# Patient Record
Sex: FEMALE | Race: WHITE | HISPANIC OR LATINO | URBAN - METROPOLITAN AREA
[De-identification: names, ages, dates, MRNs, and addresses within clinical notes are randomized per-mention and may not be internally consistent; named-entity substitution may affect disease eponyms.]

---

## 2017-10-24 ENCOUNTER — CHARTING TRANS (OUTPATIENT)
Dept: OBGYN | Age: 22
End: 2017-10-24

## 2017-10-24 ENCOUNTER — MYAURORA ACCOUNT LINK (OUTPATIENT)
Dept: OTHER | Age: 22
End: 2017-10-24

## 2017-10-26 ENCOUNTER — CHARTING TRANS (OUTPATIENT)
Dept: OTHER | Age: 22
End: 2017-10-26

## 2017-10-31 ENCOUNTER — CHARTING TRANS (OUTPATIENT)
Dept: OTHER | Age: 22
End: 2017-10-31

## 2018-01-08 ENCOUNTER — CHARTING TRANS (OUTPATIENT)
Dept: OTHER | Age: 23
End: 2018-01-08

## 2018-01-11 ENCOUNTER — IMAGING SERVICES (OUTPATIENT)
Dept: OTHER | Age: 23
End: 2018-01-11

## 2018-01-12 ENCOUNTER — CHARTING TRANS (OUTPATIENT)
Dept: OTHER | Age: 23
End: 2018-01-12

## 2018-01-16 ENCOUNTER — MYAURORA ACCOUNT LINK (OUTPATIENT)
Dept: OTHER | Age: 23
End: 2018-01-16

## 2018-01-16 ENCOUNTER — CHARTING TRANS (OUTPATIENT)
Dept: OTHER | Age: 23
End: 2018-01-16

## 2018-01-19 ENCOUNTER — CHARTING TRANS (OUTPATIENT)
Dept: OTHER | Age: 23
End: 2018-01-19

## 2018-01-23 ENCOUNTER — CHARTING TRANS (OUTPATIENT)
Dept: OTHER | Age: 23
End: 2018-01-23

## 2018-01-29 ENCOUNTER — ANCILLARY ORDERS (OUTPATIENT)
Dept: OTHER | Age: 23
End: 2018-01-29

## 2018-01-29 ENCOUNTER — CHARTING TRANS (OUTPATIENT)
Dept: OTHER | Age: 23
End: 2018-01-29

## 2018-01-29 DIAGNOSIS — Z36.3 ENCOUNTER FOR ANTENATAL SCREENING FOR MALFORMATION: ICD-10-CM

## 2018-02-22 ENCOUNTER — CHARTING TRANS (OUTPATIENT)
Dept: OTHER | Age: 23
End: 2018-02-22

## 2018-02-26 ENCOUNTER — CHARTING TRANS (OUTPATIENT)
Dept: OTHER | Age: 23
End: 2018-02-26

## 2018-03-01 ENCOUNTER — CHARTING TRANS (OUTPATIENT)
Dept: OTHER | Age: 23
End: 2018-03-01

## 2018-03-28 ENCOUNTER — CHARTING TRANS (OUTPATIENT)
Dept: OTHER | Age: 23
End: 2018-03-28

## 2018-04-02 ENCOUNTER — CHARTING TRANS (OUTPATIENT)
Dept: OTHER | Age: 23
End: 2018-04-02

## 2018-04-02 ENCOUNTER — MYAURORA ACCOUNT LINK (OUTPATIENT)
Dept: OTHER | Age: 23
End: 2018-04-02

## 2018-06-06 ENCOUNTER — CHARTING TRANS (OUTPATIENT)
Dept: OTHER | Age: 23
End: 2018-06-06

## 2018-07-30 ENCOUNTER — CHARTING TRANS (OUTPATIENT)
Dept: OTHER | Age: 23
End: 2018-07-30

## 2018-08-06 ENCOUNTER — CHARTING TRANS (OUTPATIENT)
Dept: OTHER | Age: 23
End: 2018-08-06

## 2018-10-20 ENCOUNTER — HOSPITAL ENCOUNTER (EMERGENCY)
Facility: HOSPITAL | Age: 23
Discharge: HOME | End: 2018-10-20
Attending: EMERGENCY MEDICINE

## 2018-10-20 VITALS
BODY MASS INDEX: 29.44 KG/M2 | DIASTOLIC BLOOD PRESSURE: 62 MMHG | WEIGHT: 160 LBS | RESPIRATION RATE: 17 BRPM | OXYGEN SATURATION: 100 % | HEART RATE: 98 BPM | SYSTOLIC BLOOD PRESSURE: 128 MMHG | HEIGHT: 62 IN | TEMPERATURE: 98 F

## 2018-10-20 DIAGNOSIS — S05.01XA ABRASION OF RIGHT CORNEA, INITIAL ENCOUNTER: Primary | ICD-10-CM

## 2018-10-20 PROCEDURE — 99283 EMERGENCY DEPT VISIT LOW MDM: CPT

## 2018-10-20 RX ORDER — TETRACAINE HYDROCHLORIDE 5 MG/ML
2 SOLUTION OPHTHALMIC ONCE
Status: COMPLETED | OUTPATIENT
Start: 2018-10-20 | End: 2018-10-20

## 2018-10-20 RX ORDER — CIPROFLOXACIN HYDROCHLORIDE 3.5 MG/ML
2 SOLUTION/ DROPS TOPICAL 2 TIMES DAILY
Qty: 1 BOTTLE | Refills: 0 | Status: SHIPPED | OUTPATIENT
Start: 2018-10-20 | End: 2018-10-27

## 2018-10-20 RX ORDER — TETRACAINE HYDROCHLORIDE 5 MG/ML
SOLUTION OPHTHALMIC
Status: DISCONTINUED
Start: 2018-10-20 | End: 2018-10-20

## 2018-10-20 NOTE — ED PROVIDER NOTES
Patient Seen in: [facility] Emergency Department    History   Patient presents with: Eye Visual Problem (opthalmic)    Stated Complaint: red swollen eye    HPI    14-year-old female complaining of pain in the right eye.   The patient was wearing con Labs Reviewed - No data to display       Patient did feel better with Pontocaine drops      MDM   Patient was given Cipro advised to follow-up with ophthalmologist Monday return any problems.             Disposition and Plan     Clinical Impression:  Cuco Oliver

## 2018-10-20 NOTE — ED INITIAL ASSESSMENT (HPI)
Pt notes redness to eye since Wednesday. Pt seen by provider and given abx drops for eye on Thursday. Pt notes that there is not improvement. Pt wears contacts and does not have them in at this time.  Pt unable to to visual exam.

## 2018-10-25 ENCOUNTER — CHARTING TRANS (OUTPATIENT)
Dept: OTHER | Age: 23
End: 2018-10-25

## 2018-10-29 ENCOUNTER — CHARTING TRANS (OUTPATIENT)
Dept: OTHER | Age: 23
End: 2018-10-29

## 2018-11-07 ENCOUNTER — CHARTING TRANS (OUTPATIENT)
Dept: OTHER | Age: 23
End: 2018-11-07

## 2018-11-07 ENCOUNTER — MYAURORA ACCOUNT LINK (OUTPATIENT)
Dept: OTHER | Age: 23
End: 2018-11-07

## 2018-11-28 VITALS — SYSTOLIC BLOOD PRESSURE: 118 MMHG | WEIGHT: 157 LBS | DIASTOLIC BLOOD PRESSURE: 70 MMHG

## 2019-03-06 VITALS
SYSTOLIC BLOOD PRESSURE: 100 MMHG | WEIGHT: 154 LBS | DIASTOLIC BLOOD PRESSURE: 58 MMHG | BODY MASS INDEX: 29.07 KG/M2 | HEIGHT: 61 IN

## 2019-03-06 VITALS
BODY MASS INDEX: 29.45 KG/M2 | HEIGHT: 61 IN | SYSTOLIC BLOOD PRESSURE: 112 MMHG | DIASTOLIC BLOOD PRESSURE: 70 MMHG | WEIGHT: 156 LBS

## 2021-11-08 ENCOUNTER — LAB REQUISITION (OUTPATIENT)
Dept: LAB | Age: 26
End: 2021-11-08

## 2021-11-08 ENCOUNTER — LAB SERVICES (OUTPATIENT)
Dept: LAB | Age: 26
End: 2021-11-08

## 2021-11-08 ENCOUNTER — OFFICE VISIT (OUTPATIENT)
Dept: OBGYN | Age: 26
End: 2021-11-08

## 2021-11-08 VITALS
RESPIRATION RATE: 16 BRPM | HEART RATE: 86 BPM | DIASTOLIC BLOOD PRESSURE: 70 MMHG | TEMPERATURE: 97.4 F | BODY MASS INDEX: 35.87 KG/M2 | WEIGHT: 190 LBS | SYSTOLIC BLOOD PRESSURE: 118 MMHG | HEIGHT: 61 IN

## 2021-11-08 DIAGNOSIS — O36.80X0 PREGNANCY WITH UNCERTAIN FETAL VIABILITY, SINGLE OR UNSPECIFIED FETUS: ICD-10-CM

## 2021-11-08 DIAGNOSIS — Z23 NEED FOR VACCINATION: ICD-10-CM

## 2021-11-08 DIAGNOSIS — N91.2 AMENORRHEA: Primary | ICD-10-CM

## 2021-11-08 DIAGNOSIS — Z32.00 POSSIBLE PREGNANCY, NOT CONFIRMED: ICD-10-CM

## 2021-11-08 DIAGNOSIS — N91.2 AMENORRHEA: ICD-10-CM

## 2021-11-08 DIAGNOSIS — Z11.3 SCREEN FOR STD (SEXUALLY TRANSMITTED DISEASE): ICD-10-CM

## 2021-11-08 DIAGNOSIS — N91.2 AMENORRHEA, UNSPECIFIED: ICD-10-CM

## 2021-11-08 DIAGNOSIS — O99.210 OBESITY IN PREGNANCY: ICD-10-CM

## 2021-11-08 LAB
B-HCG SERPL-ACNC: ABNORMAL M[IU]/ML (ref 0–6)
BACTERIA: ABNORMAL
BASOPHIL %: 0.3 % (ref 0–1.2)
BASOPHIL ABSOLUTE #: 0 10*3/UL (ref 0–0.1)
BILIRUBIN URINE: NEGATIVE
BLOOD URINE: NEGATIVE
CLARITY: ABNORMAL
COLOR: ABNORMAL
DIFFERENTIAL TYPE: NORMAL
EOSINOPHIL %: 0.7 % (ref 0–10)
EOSINOPHIL ABSOLUTE #: 0.1 10*3/UL (ref 0–0.5)
GLUCOSE QUALITATIVE U: NEGATIVE
HBV SURFACE AG SERPL QL IA: NEGATIVE
HEMATOCRIT: 39.4 % (ref 34–45)
HEMOGLOBIN: 13.1 G/DL (ref 11.2–15.7)
HIV1+2 AB SERPL QL IA: NEGATIVE
IMMATURE GRANULOCYTE ABSOLUTE: 0.03 10*3/UL (ref 0–0.05)
IMMATURE GRANULOCYTE PERCENT: 0.3 % (ref 0–0.5)
KETONES, URINE: ABNORMAL
LEUKOCYTE ESTERASE URINE: ABNORMAL
LYMPH PERCENT: 30.3 % (ref 20.5–51.1)
LYMPHOCYTE ABSOLUTE #: 3 10*3/UL (ref 1.2–3.4)
MEAN CORPUSCULAR HGB CONCENTRATION: 33.2 % (ref 32–36)
MEAN CORPUSCULAR HGB: 31 PG (ref 27–34)
MEAN CORPUSCULAR VOLUME: 93.4 FL (ref 79–95)
MEAN PLATELET VOLUME: 10.2 FL (ref 8.6–12.4)
MONOCYTE ABSOLUTE #: 0.6 10*3/UL (ref 0.2–0.9)
MONOCYTE PERCENT: 5.9 % (ref 4.3–12.9)
MUCOUS: ABNORMAL
NEUTROPHIL ABSOLUTE #: 6.1 10*3/UL (ref 1.4–6.5)
NEUTROPHIL PERCENT: 62.5 % (ref 34–73.5)
NITRITE URINE: POSITIVE
PH URINE: 5 (ref 5–7)
PLATELET COUNT: 325 10*3/UL (ref 150–400)
RED BLOOD CELL COUNT: 4.22 10*6/UL (ref 3.7–5.2)
RED BLOOD CELLS URINE: ABNORMAL (ref 0–3)
RED CELL DISTRIBUTION WIDTH: 12.6 % (ref 11.3–14.8)
RUBV IGG SERPL QL IA: NORMAL [IU]/ML
SPECIFIC GRAVITY URINE: 1.03 (ref 1–1.03)
SQUAMOUS EPITHELIAL CELLS: ABNORMAL
URINE PROTEIN, QUAL (DIPSTICK): 30
UROBILINOGEN URINE: <2
WHITE BLOOD CELL COUNT: 9.8 10*3/UL (ref 4–10)
WHITE BLOOD CELLS URINE: ABNORMAL (ref 0–5)

## 2021-11-08 PROCEDURE — 87086 URINE CULTURE/COLONY COUNT: CPT | Performed by: NURSE PRACTITIONER

## 2021-11-08 PROCEDURE — 81003 URINALYSIS AUTO W/O SCOPE: CPT | Performed by: NURSE PRACTITIONER

## 2021-11-08 PROCEDURE — 99204 OFFICE O/P NEW MOD 45 MIN: CPT | Performed by: NURSE PRACTITIONER

## 2021-11-08 PROCEDURE — 90471 IMMUNIZATION ADMIN: CPT

## 2021-11-08 PROCEDURE — 87801 DETECT AGNT MULT DNA AMPLI: CPT | Performed by: NURSE PRACTITIONER

## 2021-11-08 PROCEDURE — 86703 HIV-1/HIV-2 1 RESULT ANTBDY: CPT | Performed by: NURSE PRACTITIONER

## 2021-11-08 PROCEDURE — 87088 URINE BACTERIA CULTURE: CPT | Performed by: NURSE PRACTITIONER

## 2021-11-08 PROCEDURE — 84702 CHORIONIC GONADOTROPIN TEST: CPT | Performed by: NURSE PRACTITIONER

## 2021-11-08 PROCEDURE — 87186 SC STD MICRODIL/AGAR DIL: CPT | Performed by: NURSE PRACTITIONER

## 2021-11-08 PROCEDURE — 90686 IIV4 VACC NO PRSV 0.5 ML IM: CPT

## 2021-11-08 PROCEDURE — PSEU8014 ABO GROUP/RH /SCREEN OUTPATIENT: Performed by: CLINICAL MEDICAL LABORATORY

## 2021-11-08 PROCEDURE — 86850 RBC ANTIBODY SCREEN: CPT | Performed by: CLINICAL MEDICAL LABORATORY

## 2021-11-08 PROCEDURE — 86901 BLOOD TYPING SEROLOGIC RH(D): CPT | Performed by: CLINICAL MEDICAL LABORATORY

## 2021-11-08 PROCEDURE — 36415 COLL VENOUS BLD VENIPUNCTURE: CPT | Performed by: NURSE PRACTITIONER

## 2021-11-08 PROCEDURE — 87661 TRICHOMONAS VAGINALIS AMPLIF: CPT | Performed by: NURSE PRACTITIONER

## 2021-11-08 PROCEDURE — 86900 BLOOD TYPING SEROLOGIC ABO: CPT | Performed by: CLINICAL MEDICAL LABORATORY

## 2021-11-08 ASSESSMENT — PATIENT HEALTH QUESTIONNAIRE - PHQ9
CLINICAL INTERPRETATION OF PHQ2 SCORE: NO FURTHER SCREENING NEEDED
CLINICAL INTERPRETATION OF PHQ9 SCORE: NO FURTHER SCREENING NEEDED
SUM OF ALL RESPONSES TO PHQ9 QUESTIONS 1 AND 2: 0
1. LITTLE INTEREST OR PLEASURE IN DOING THINGS: NOT AT ALL
SUM OF ALL RESPONSES TO PHQ9 QUESTIONS 1 AND 2: 0
SUM OF ALL RESPONSES TO PHQ9 QUESTIONS 1 AND 2: 0
2. FEELING DOWN, DEPRESSED OR HOPELESS: NOT AT ALL

## 2021-11-09 LAB
ABO + RH BLD: NORMAL
ABO + RH BLD: NORMAL
BLD GP AB SCN SERPL QL GEL: NEGATIVE
BLD GP AB SCN SERPL QL GEL: NEGATIVE
C TRACH DNA GENITAL QL NAA+PROBE: POSITIVE
N GONORRHOEA DNA GENITAL QL NAA+PROBE: NEGATIVE
RPR SER QL: NORMAL
T VAGINALIS DNA GENITAL QL NAA+PROBE: NEGATIVE

## 2021-11-09 RX ORDER — AMOXICILLIN 500 MG/1
500 TABLET, FILM COATED ORAL 3 TIMES DAILY
Qty: 21 TABLET | Refills: 0 | Status: SHIPPED | OUTPATIENT
Start: 2021-11-09 | End: 2021-11-16

## 2021-11-10 LAB — FINAL REPORT: ABNORMAL

## 2021-11-11 DIAGNOSIS — Z87.440 HISTORY OF UTI: Primary | ICD-10-CM

## 2021-11-13 ENCOUNTER — E-ADVICE (OUTPATIENT)
Dept: OBGYN | Age: 26
End: 2021-11-13

## 2021-11-16 RX ORDER — METOCLOPRAMIDE 10 MG/1
10 TABLET ORAL 4 TIMES DAILY
Qty: 40 TABLET | Refills: 0 | Status: SHIPPED | OUTPATIENT
Start: 2021-11-16 | End: 2021-11-26

## 2021-11-17 RX ORDER — NITROFURANTOIN 25; 75 MG/1; MG/1
100 CAPSULE ORAL 2 TIMES DAILY
Qty: 10 CAPSULE | Refills: 0 | Status: SHIPPED | OUTPATIENT
Start: 2021-11-17 | End: 2022-02-11 | Stop reason: ALTCHOICE

## 2021-11-22 ENCOUNTER — E-ADVICE (OUTPATIENT)
Dept: OBGYN | Age: 26
End: 2021-11-22

## 2021-11-30 ENCOUNTER — E-ADVICE (OUTPATIENT)
Dept: OBGYN | Age: 26
End: 2021-11-30

## 2021-12-01 DIAGNOSIS — N91.2 AMENORRHEA: Primary | ICD-10-CM

## 2021-12-03 ENCOUNTER — LAB SERVICES (OUTPATIENT)
Dept: LAB | Age: 26
End: 2021-12-03

## 2021-12-03 DIAGNOSIS — Z87.440 HISTORY OF UTI: ICD-10-CM

## 2021-12-03 DIAGNOSIS — O99.210 OBESITY IN PREGNANCY: ICD-10-CM

## 2021-12-03 LAB — GLUCOSE 1H P 50 G GLC PO SERPL-MCNC: 114 MG/DL (ref 70–130)

## 2021-12-03 PROCEDURE — 82950 GLUCOSE TEST: CPT | Performed by: NURSE PRACTITIONER

## 2021-12-03 PROCEDURE — 87086 URINE CULTURE/COLONY COUNT: CPT | Performed by: NURSE PRACTITIONER

## 2021-12-03 PROCEDURE — 36415 COLL VENOUS BLD VENIPUNCTURE: CPT | Performed by: NURSE PRACTITIONER

## 2021-12-04 LAB — FINAL REPORT: NORMAL

## 2021-12-08 ENCOUNTER — IMAGING SERVICES (OUTPATIENT)
Dept: ULTRASOUND IMAGING | Age: 26
End: 2021-12-08
Attending: NURSE PRACTITIONER

## 2021-12-08 ENCOUNTER — FIRST OB VISIT (OUTPATIENT)
Dept: OBGYN | Age: 26
End: 2021-12-08

## 2021-12-08 ENCOUNTER — TELEPHONE (OUTPATIENT)
Dept: OBGYN | Age: 26
End: 2021-12-08

## 2021-12-08 VITALS
SYSTOLIC BLOOD PRESSURE: 118 MMHG | HEIGHT: 61 IN | TEMPERATURE: 97 F | WEIGHT: 188.38 LBS | BODY MASS INDEX: 35.57 KG/M2 | RESPIRATION RATE: 16 BRPM | DIASTOLIC BLOOD PRESSURE: 70 MMHG

## 2021-12-08 DIAGNOSIS — N91.2 AMENORRHEA: ICD-10-CM

## 2021-12-08 DIAGNOSIS — Z11.8 SCREENING FOR CHLAMYDIAL DISEASE: ICD-10-CM

## 2021-12-08 DIAGNOSIS — A74.9 CHLAMYDIA INFECTION AFFECTING PREGNANCY IN FIRST TRIMESTER: ICD-10-CM

## 2021-12-08 DIAGNOSIS — O98.811 CHLAMYDIA INFECTION AFFECTING PREGNANCY IN FIRST TRIMESTER: ICD-10-CM

## 2021-12-08 DIAGNOSIS — O99.211 OBESITY AFFECTING PREGNANCY IN FIRST TRIMESTER: Primary | ICD-10-CM

## 2021-12-08 PROBLEM — O99.210 OBESITY COMPLICATING PREGNANCY: Status: ACTIVE | Noted: 2021-12-08

## 2021-12-08 PROCEDURE — 0500F INITIAL PRENATAL CARE VISIT: CPT | Performed by: OBSTETRICS & GYNECOLOGY

## 2021-12-08 PROCEDURE — 87491 CHLMYD TRACH DNA AMP PROBE: CPT | Performed by: OBSTETRICS & GYNECOLOGY

## 2021-12-08 PROCEDURE — 87661 TRICHOMONAS VAGINALIS AMPLIF: CPT | Performed by: OBSTETRICS & GYNECOLOGY

## 2021-12-08 PROCEDURE — 76801 OB US < 14 WKS SINGLE FETUS: CPT | Performed by: RADIOLOGY

## 2021-12-08 PROCEDURE — 87591 N.GONORRHOEAE DNA AMP PROB: CPT | Performed by: OBSTETRICS & GYNECOLOGY

## 2021-12-08 RX ORDER — ONDANSETRON HYDROCHLORIDE 8 MG/1
8 TABLET, FILM COATED ORAL EVERY 8 HOURS PRN
Qty: 20 TABLET | Refills: 3 | Status: SHIPPED | OUTPATIENT
Start: 2021-12-08 | End: 2022-06-22 | Stop reason: ALTCHOICE

## 2021-12-08 ASSESSMENT — LIFESTYLE VARIABLES
DOES YOUR PARTNER HAVE A PROBLEM WITH ALCOHOL OR DRUG USE?: NO
IN THE PAST MONTH, DID YOU DRINK BEER, WINE OR LIQUOR, OR USE OTHER DRUGS? (PREGNANCY): NO
BEFORE YOU WERE PREGNANT DID YOU HAVE A PROBLEM WITH ALCOHOL OR DRUG USE? (PAST): NO
DID ANY OF YOUR PARENTS HAVE PROBLEMS WITH ALCOHOL OR DRUG USE?: NO
DO ANY OF YOUR FRIENDS (PEERS) HAVE PROBLEMS WITH ALCOHOL OR DRUG USE?: NO

## 2021-12-09 LAB
C TRACH DNA UR QL NAA+PROBE: NEGATIVE
N GONORRHOEA DNA UR QL NAA+PROBE: NEGATIVE
T VAGINALIS DNA UR QL NAA+PROBE: NEGATIVE

## 2021-12-22 ENCOUNTER — LAB SERVICES (OUTPATIENT)
Dept: LAB | Age: 26
End: 2021-12-22

## 2021-12-22 DIAGNOSIS — Z82.79 FAMILY HISTORY OF DOWNS SYNDROME: ICD-10-CM

## 2021-12-22 PROCEDURE — 36415 COLL VENOUS BLD VENIPUNCTURE: CPT | Performed by: OBSTETRICS & GYNECOLOGY

## 2021-12-28 LAB — SEND OUT RESULTS: NORMAL

## 2022-01-05 ENCOUNTER — APPOINTMENT (OUTPATIENT)
Dept: OBGYN | Age: 27
End: 2022-01-05

## 2022-01-10 ENCOUNTER — TELEPHONE (OUTPATIENT)
Dept: OBGYN | Age: 27
End: 2022-01-10

## 2022-01-10 ENCOUNTER — OB CHECK (OUTPATIENT)
Dept: OBGYN | Age: 27
End: 2022-01-10

## 2022-01-10 VITALS
BODY MASS INDEX: 34.67 KG/M2 | RESPIRATION RATE: 18 BRPM | TEMPERATURE: 97 F | WEIGHT: 183.5 LBS | DIASTOLIC BLOOD PRESSURE: 68 MMHG | SYSTOLIC BLOOD PRESSURE: 118 MMHG | HEART RATE: 70 BPM

## 2022-01-10 DIAGNOSIS — O09.90 SUPERVISION OF HIGH RISK PREGNANCY, ANTEPARTUM: Primary | ICD-10-CM

## 2022-01-10 PROCEDURE — 0502F SUBSEQUENT PRENATAL CARE: CPT | Performed by: OBSTETRICS & GYNECOLOGY

## 2022-02-07 ENCOUNTER — APPOINTMENT (OUTPATIENT)
Dept: OBGYN | Age: 27
End: 2022-02-07

## 2022-02-10 ENCOUNTER — APPOINTMENT (OUTPATIENT)
Dept: OBGYN | Age: 27
End: 2022-02-10

## 2022-02-11 ENCOUNTER — OB CHECK (OUTPATIENT)
Dept: OBGYN | Age: 27
End: 2022-02-11

## 2022-02-11 VITALS
HEART RATE: 80 BPM | HEIGHT: 61 IN | WEIGHT: 202.2 LBS | TEMPERATURE: 97.2 F | SYSTOLIC BLOOD PRESSURE: 130 MMHG | BODY MASS INDEX: 38.18 KG/M2 | RESPIRATION RATE: 20 BRPM | DIASTOLIC BLOOD PRESSURE: 68 MMHG

## 2022-02-11 DIAGNOSIS — O99.210 OBESITY AFFECTING PREGNANCY, ANTEPARTUM: Primary | ICD-10-CM

## 2022-02-11 PROBLEM — Z23 COVID-19 VACCINE ADMINISTERED: Status: ACTIVE | Noted: 2022-02-11

## 2022-02-11 PROBLEM — Z78.9 NOT IMMUNE TO RUBELLA: Status: ACTIVE | Noted: 2022-02-11

## 2022-02-11 PROCEDURE — 0502F SUBSEQUENT PRENATAL CARE: CPT | Performed by: NURSE PRACTITIONER

## 2022-02-17 ENCOUNTER — IMAGING SERVICES (OUTPATIENT)
Dept: ULTRASOUND IMAGING | Age: 27
End: 2022-02-17
Attending: OBSTETRICS & GYNECOLOGY

## 2022-02-17 ENCOUNTER — OB CHECK (OUTPATIENT)
Dept: MATERNAL FETAL MEDICINE | Age: 27
End: 2022-02-17

## 2022-02-17 VITALS — WEIGHT: 201.4 LBS | BODY MASS INDEX: 38.05 KG/M2 | SYSTOLIC BLOOD PRESSURE: 118 MMHG | DIASTOLIC BLOOD PRESSURE: 80 MMHG

## 2022-02-17 DIAGNOSIS — O99.212 OBESITY AFFECTING PREGNANCY IN SECOND TRIMESTER: Primary | ICD-10-CM

## 2022-02-17 DIAGNOSIS — O09.90 SUPERVISION OF HIGH RISK PREGNANCY, ANTEPARTUM: ICD-10-CM

## 2022-02-17 PROCEDURE — X1094 US OB W DETAILED FETAL ANATOMIC EVAL 1 FETUS: HCPCS | Performed by: OBSTETRICS & GYNECOLOGY

## 2022-02-17 PROCEDURE — 99202 OFFICE O/P NEW SF 15 MIN: CPT | Performed by: OBSTETRICS & GYNECOLOGY

## 2022-02-17 PROCEDURE — 76811 OB US DETAILED SNGL FETUS: CPT | Performed by: OBSTETRICS & GYNECOLOGY

## 2022-02-25 ENCOUNTER — WALK IN (OUTPATIENT)
Dept: URGENT CARE | Age: 27
End: 2022-02-25

## 2022-02-25 VITALS
TEMPERATURE: 97.8 F | DIASTOLIC BLOOD PRESSURE: 70 MMHG | HEART RATE: 102 BPM | OXYGEN SATURATION: 98 % | SYSTOLIC BLOOD PRESSURE: 110 MMHG | RESPIRATION RATE: 16 BRPM

## 2022-02-25 DIAGNOSIS — J32.9 SINUSITIS, UNSPECIFIED CHRONICITY, UNSPECIFIED LOCATION: Primary | ICD-10-CM

## 2022-02-25 PROCEDURE — 99214 OFFICE O/P EST MOD 30 MIN: CPT | Performed by: FAMILY MEDICINE

## 2022-02-25 RX ORDER — AMOXICILLIN 875 MG/1
875 TABLET, COATED ORAL 2 TIMES DAILY
Qty: 20 TABLET | Refills: 0 | Status: SHIPPED | OUTPATIENT
Start: 2022-02-25 | End: 2022-03-07

## 2022-03-15 ENCOUNTER — OB CHECK (OUTPATIENT)
Dept: OBGYN | Age: 27
End: 2022-03-15

## 2022-03-15 VITALS
BODY MASS INDEX: 37.98 KG/M2 | RESPIRATION RATE: 16 BRPM | SYSTOLIC BLOOD PRESSURE: 124 MMHG | DIASTOLIC BLOOD PRESSURE: 70 MMHG | WEIGHT: 201 LBS | TEMPERATURE: 97.5 F

## 2022-03-15 DIAGNOSIS — O99.212 OBESITY AFFECTING PREGNANCY IN SECOND TRIMESTER: Primary | ICD-10-CM

## 2022-03-15 PROCEDURE — 0502F SUBSEQUENT PRENATAL CARE: CPT | Performed by: OBSTETRICS & GYNECOLOGY

## 2022-03-31 PROCEDURE — 99219 INITIAL OBSERVATION CARE,LEVL II: CPT | Performed by: OBSTETRICS & GYNECOLOGY

## 2022-03-31 PROCEDURE — 59025 FETAL NON-STRESS TEST: CPT | Performed by: OBSTETRICS & GYNECOLOGY

## 2022-04-04 RX ORDER — NITROFURANTOIN 25; 75 MG/1; MG/1
100 CAPSULE ORAL 2 TIMES DAILY
Qty: 6 CAPSULE | Refills: 0 | Status: SHIPPED | OUTPATIENT
Start: 2022-04-04 | End: 2022-04-07

## 2022-04-05 ENCOUNTER — TELEPHONE (OUTPATIENT)
Dept: OBGYN | Age: 27
End: 2022-04-05

## 2022-04-12 ENCOUNTER — LAB SERVICES (OUTPATIENT)
Dept: LAB | Age: 27
End: 2022-04-12

## 2022-04-12 ENCOUNTER — OB CHECK (OUTPATIENT)
Dept: OBGYN | Age: 27
End: 2022-04-12

## 2022-04-12 ENCOUNTER — TELEPHONE (OUTPATIENT)
Dept: OBGYN | Age: 27
End: 2022-04-12

## 2022-04-12 VITALS
BODY MASS INDEX: 38.78 KG/M2 | SYSTOLIC BLOOD PRESSURE: 120 MMHG | DIASTOLIC BLOOD PRESSURE: 70 MMHG | TEMPERATURE: 96.6 F | WEIGHT: 205.25 LBS | RESPIRATION RATE: 16 BRPM

## 2022-04-12 DIAGNOSIS — O99.213 OBESITY AFFECTING PREGNANCY IN THIRD TRIMESTER: Primary | ICD-10-CM

## 2022-04-12 DIAGNOSIS — O99.212 OBESITY AFFECTING PREGNANCY IN SECOND TRIMESTER: ICD-10-CM

## 2022-04-12 DIAGNOSIS — Z23 NEED FOR TDAP VACCINATION: ICD-10-CM

## 2022-04-12 LAB
BASOPHIL %: 0.2 % (ref 0–1.2)
BASOPHIL ABSOLUTE #: 0 10*3/UL (ref 0–0.1)
DIFFERENTIAL TYPE: ABNORMAL
EOSINOPHIL %: 0.7 % (ref 0–10)
EOSINOPHIL ABSOLUTE #: 0.1 10*3/UL (ref 0–0.5)
GLUCOSE 1H P 50 G GLC PO SERPL-MCNC: 82 MG/DL (ref 70–130)
HEMATOCRIT: 36.9 % (ref 34–45)
HEMOGLOBIN: 12.4 G/DL (ref 11.2–15.7)
HIV1+2 AB SERPL QL IA: NEGATIVE
IMMATURE GRANULOCYTE ABSOLUTE: 0.19 10*3/UL (ref 0–0.05)
IMMATURE GRANULOCYTE PERCENT: 1.6 % (ref 0–0.5)
LYMPH PERCENT: 27.6 % (ref 20.5–51.1)
LYMPHOCYTE ABSOLUTE #: 3.3 10*3/UL (ref 1.2–3.4)
MEAN CORPUSCULAR HGB CONCENTRATION: 33.6 % (ref 32–36)
MEAN CORPUSCULAR HGB: 31.9 PG (ref 27–34)
MEAN CORPUSCULAR VOLUME: 94.9 FL (ref 79–95)
MEAN PLATELET VOLUME: 10.1 FL (ref 8.6–12.4)
MONOCYTE ABSOLUTE #: 0.7 10*3/UL (ref 0.2–0.9)
MONOCYTE PERCENT: 6.1 % (ref 4.3–12.9)
NEUTROPHIL ABSOLUTE #: 7.7 10*3/UL (ref 1.4–6.5)
NEUTROPHIL PERCENT: 63.8 % (ref 34–73.5)
PLATELET COUNT: 286 10*3/UL (ref 150–400)
RED BLOOD CELL COUNT: 3.89 10*6/UL (ref 3.7–5.2)
RED CELL DISTRIBUTION WIDTH: 12.8 % (ref 11.3–14.8)
WHITE BLOOD CELL COUNT: 12.1 10*3/UL (ref 4–10)

## 2022-04-12 PROCEDURE — 82950 GLUCOSE TEST: CPT | Performed by: OBSTETRICS & GYNECOLOGY

## 2022-04-12 PROCEDURE — 0502F SUBSEQUENT PRENATAL CARE: CPT | Performed by: OBSTETRICS & GYNECOLOGY

## 2022-04-12 PROCEDURE — 90715 TDAP VACCINE 7 YRS/> IM: CPT | Performed by: OBSTETRICS & GYNECOLOGY

## 2022-04-12 PROCEDURE — 90471 IMMUNIZATION ADMIN: CPT | Performed by: OBSTETRICS & GYNECOLOGY

## 2022-04-12 PROCEDURE — 86592 SYPHILIS TEST NON-TREP QUAL: CPT | Performed by: OBSTETRICS & GYNECOLOGY

## 2022-04-12 PROCEDURE — 85025 COMPLETE CBC W/AUTO DIFF WBC: CPT | Performed by: OBSTETRICS & GYNECOLOGY

## 2022-04-12 PROCEDURE — 36415 COLL VENOUS BLD VENIPUNCTURE: CPT | Performed by: OBSTETRICS & GYNECOLOGY

## 2022-04-12 PROCEDURE — 86703 HIV-1/HIV-2 1 RESULT ANTBDY: CPT | Performed by: OBSTETRICS & GYNECOLOGY

## 2022-04-13 LAB — RPR SER QL: NORMAL

## 2022-04-21 ENCOUNTER — TELEPHONE (OUTPATIENT)
Dept: OBGYN | Age: 27
End: 2022-04-21

## 2022-04-25 RX ORDER — AMOXICILLIN 875 MG/1
TABLET, COATED ORAL
Qty: 20 TABLET | Refills: 0 | OUTPATIENT
Start: 2022-04-25

## 2022-04-28 ENCOUNTER — IMAGING SERVICES (OUTPATIENT)
Dept: ULTRASOUND IMAGING | Age: 27
End: 2022-04-28
Attending: OBSTETRICS & GYNECOLOGY

## 2022-04-28 ENCOUNTER — TELEPHONE (OUTPATIENT)
Dept: OBGYN | Age: 27
End: 2022-04-28

## 2022-04-28 ENCOUNTER — LAB REQUISITION (OUTPATIENT)
Dept: LAB | Age: 27
End: 2022-04-28

## 2022-04-28 ENCOUNTER — OB CHECK (OUTPATIENT)
Dept: MATERNAL FETAL MEDICINE | Age: 27
End: 2022-04-28

## 2022-04-28 ENCOUNTER — LAB SERVICES (OUTPATIENT)
Dept: LAB | Age: 27
End: 2022-04-28

## 2022-04-28 VITALS
HEART RATE: 104 BPM | WEIGHT: 207.6 LBS | RESPIRATION RATE: 20 BRPM | BODY MASS INDEX: 39.23 KG/M2 | DIASTOLIC BLOOD PRESSURE: 76 MMHG | SYSTOLIC BLOOD PRESSURE: 120 MMHG

## 2022-04-28 DIAGNOSIS — O99.213 OBESITY AFFECTING PREGNANCY IN THIRD TRIMESTER: Primary | ICD-10-CM

## 2022-04-28 DIAGNOSIS — O99.212 OBESITY AFFECTING PREGNANCY IN SECOND TRIMESTER: ICD-10-CM

## 2022-04-28 DIAGNOSIS — O40.3XX0 POLYHYDRAMNIOS, THIRD TRIMESTER, NOT APPLICABLE OR UNSPECIFIED: ICD-10-CM

## 2022-04-28 DIAGNOSIS — O40.3XX0 POLYHYDRAMNIOS IN THIRD TRIMESTER COMPLICATION, SINGLE OR UNSPECIFIED FETUS: ICD-10-CM

## 2022-04-28 PROCEDURE — PSEU8027 ANTIBODY SCREEN: Performed by: CLINICAL MEDICAL LABORATORY

## 2022-04-28 PROCEDURE — 36415 COLL VENOUS BLD VENIPUNCTURE: CPT | Performed by: OBSTETRICS & GYNECOLOGY

## 2022-04-28 PROCEDURE — 86644 CMV ANTIBODY: CPT | Performed by: OBSTETRICS & GYNECOLOGY

## 2022-04-28 PROCEDURE — PSEU8547 PARVOVIRUS B19 ANTIBODY IGG & IGM: Performed by: CLINICAL MEDICAL LABORATORY

## 2022-04-28 PROCEDURE — 86850 RBC ANTIBODY SCREEN: CPT | Performed by: OBSTETRICS & GYNECOLOGY

## 2022-04-28 PROCEDURE — 99214 OFFICE O/P EST MOD 30 MIN: CPT | Performed by: OBSTETRICS & GYNECOLOGY

## 2022-04-28 PROCEDURE — 76816 OB US FOLLOW-UP PER FETUS: CPT | Performed by: OBSTETRICS & GYNECOLOGY

## 2022-04-28 PROCEDURE — 86644 CMV ANTIBODY: CPT | Performed by: CLINICAL MEDICAL LABORATORY

## 2022-04-28 PROCEDURE — PSEU8473 CMV ANTIBODY IGG & IGM W REFLEX: Performed by: CLINICAL MEDICAL LABORATORY

## 2022-04-28 PROCEDURE — 86747 PARVOVIRUS ANTIBODY: CPT | Performed by: CLINICAL MEDICAL LABORATORY

## 2022-04-28 PROCEDURE — PSEU9493 CYTOMEGALOVIRUS AVIDITY, IGG: Performed by: CLINICAL MEDICAL LABORATORY

## 2022-04-28 PROCEDURE — 86747 PARVOVIRUS ANTIBODY: CPT | Performed by: OBSTETRICS & GYNECOLOGY

## 2022-04-28 PROCEDURE — 86645 CMV ANTIBODY IGM: CPT | Performed by: OBSTETRICS & GYNECOLOGY

## 2022-04-28 PROCEDURE — 86592 SYPHILIS TEST NON-TREP QUAL: CPT | Performed by: OBSTETRICS & GYNECOLOGY

## 2022-04-28 PROCEDURE — 86645 CMV ANTIBODY IGM: CPT | Performed by: CLINICAL MEDICAL LABORATORY

## 2022-04-28 PROCEDURE — 86850 RBC ANTIBODY SCREEN: CPT | Performed by: CLINICAL MEDICAL LABORATORY

## 2022-04-29 LAB
BLD GP AB SCN SERPL QL GEL: NEGATIVE
BLD GP AB SCN SERPL QL GEL: NEGATIVE
RPR SER QL: NORMAL

## 2022-05-02 LAB
B19V IGG SER IA-ACNC: 0.2 IV
B19V IGG SER IA-ACNC: 0.2 IV
B19V IGM SER IA-ACNC: 0.1 IV
B19V IGM SER IA-ACNC: 0.1 IV
CMV IGG SERPL IA-ACNC: 1.11 ISR
CMV IGG SERPL IA-ACNC: 1.11 ISR
CMV IGM SERPL IA-ACNC: 0.83 OD RATIO
CMV IGM SERPL QL IA: 0.83 OD RATIO

## 2022-05-03 LAB
CMV IGG AVIDITY SERPL IA-RTO: 0.71 %
CMV IGG AVIDITY SERPL-RTO: 0.71

## 2022-05-06 ENCOUNTER — TELEPHONE (OUTPATIENT)
Dept: OBGYN | Age: 27
End: 2022-05-06

## 2022-05-11 ENCOUNTER — IMAGING SERVICES (OUTPATIENT)
Dept: ULTRASOUND IMAGING | Age: 27
End: 2022-05-11
Attending: OBSTETRICS & GYNECOLOGY

## 2022-05-11 ENCOUNTER — OB CHECK (OUTPATIENT)
Dept: OBGYN | Age: 27
End: 2022-05-11

## 2022-05-11 VITALS
SYSTOLIC BLOOD PRESSURE: 122 MMHG | WEIGHT: 207.12 LBS | BODY MASS INDEX: 39.14 KG/M2 | HEART RATE: 88 BPM | RESPIRATION RATE: 16 BRPM | DIASTOLIC BLOOD PRESSURE: 72 MMHG

## 2022-05-11 DIAGNOSIS — O99.213 OBESITY AFFECTING PREGNANCY IN THIRD TRIMESTER: ICD-10-CM

## 2022-05-11 DIAGNOSIS — O99.213 OBESITY AFFECTING PREGNANCY IN THIRD TRIMESTER: Primary | ICD-10-CM

## 2022-05-11 DIAGNOSIS — O40.3XX0 POLYHYDRAMNIOS IN THIRD TRIMESTER COMPLICATION, SINGLE OR UNSPECIFIED FETUS: ICD-10-CM

## 2022-05-11 PROCEDURE — 0502F SUBSEQUENT PRENATAL CARE: CPT | Performed by: OBSTETRICS & GYNECOLOGY

## 2022-05-11 PROCEDURE — 76815 OB US LIMITED FETUS(S): CPT | Performed by: RADIOLOGY

## 2022-05-11 PROCEDURE — 59025 FETAL NON-STRESS TEST: CPT

## 2022-05-13 ENCOUNTER — APPOINTMENT (OUTPATIENT)
Dept: OBGYN | Age: 27
End: 2022-05-13

## 2022-05-18 ENCOUNTER — APPOINTMENT (OUTPATIENT)
Dept: ULTRASOUND IMAGING | Age: 27
End: 2022-05-18
Attending: OBSTETRICS & GYNECOLOGY

## 2022-05-18 ENCOUNTER — TELEPHONE (OUTPATIENT)
Dept: OBGYN | Age: 27
End: 2022-05-18

## 2022-05-18 ENCOUNTER — APPOINTMENT (OUTPATIENT)
Dept: OBGYN | Age: 27
End: 2022-05-18

## 2022-05-19 ENCOUNTER — IMAGING SERVICES (OUTPATIENT)
Dept: ULTRASOUND IMAGING | Age: 27
End: 2022-05-19
Attending: OBSTETRICS & GYNECOLOGY

## 2022-05-19 ENCOUNTER — OB CHECK (OUTPATIENT)
Dept: OBGYN | Age: 27
End: 2022-05-19

## 2022-05-19 VITALS
RESPIRATION RATE: 14 BRPM | SYSTOLIC BLOOD PRESSURE: 106 MMHG | DIASTOLIC BLOOD PRESSURE: 56 MMHG | WEIGHT: 211.2 LBS | TEMPERATURE: 98.2 F | BODY MASS INDEX: 39.91 KG/M2 | HEART RATE: 84 BPM

## 2022-05-19 DIAGNOSIS — O40.3XX0 POLYHYDRAMNIOS IN THIRD TRIMESTER COMPLICATION, SINGLE OR UNSPECIFIED FETUS: ICD-10-CM

## 2022-05-19 DIAGNOSIS — O99.213 OBESITY AFFECTING PREGNANCY IN THIRD TRIMESTER: ICD-10-CM

## 2022-05-19 DIAGNOSIS — O99.213 OBESITY AFFECTING PREGNANCY IN THIRD TRIMESTER: Primary | ICD-10-CM

## 2022-05-19 DIAGNOSIS — O09.90 SUPERVISION OF HIGH RISK PREGNANCY, ANTEPARTUM: Primary | ICD-10-CM

## 2022-05-19 PROCEDURE — 59025 FETAL NON-STRESS TEST: CPT

## 2022-05-19 PROCEDURE — 76815 OB US LIMITED FETUS(S): CPT | Performed by: RADIOLOGY

## 2022-05-19 PROCEDURE — 0502F SUBSEQUENT PRENATAL CARE: CPT | Performed by: OBSTETRICS & GYNECOLOGY

## 2022-05-20 ENCOUNTER — APPOINTMENT (OUTPATIENT)
Dept: ULTRASOUND IMAGING | Age: 27
End: 2022-05-20
Attending: OBSTETRICS & GYNECOLOGY

## 2022-05-25 ENCOUNTER — OB CHECK (OUTPATIENT)
Dept: OBGYN | Age: 27
End: 2022-05-25

## 2022-05-25 ENCOUNTER — IMAGING SERVICES (OUTPATIENT)
Dept: ULTRASOUND IMAGING | Age: 27
End: 2022-05-25
Attending: OBSTETRICS & GYNECOLOGY

## 2022-05-25 VITALS
BODY MASS INDEX: 39.93 KG/M2 | DIASTOLIC BLOOD PRESSURE: 68 MMHG | HEART RATE: 70 BPM | RESPIRATION RATE: 16 BRPM | SYSTOLIC BLOOD PRESSURE: 110 MMHG | WEIGHT: 211.31 LBS

## 2022-05-25 DIAGNOSIS — O99.213 OBESITY AFFECTING PREGNANCY IN THIRD TRIMESTER: Primary | ICD-10-CM

## 2022-05-25 DIAGNOSIS — O99.213 OBESITY AFFECTING PREGNANCY IN THIRD TRIMESTER: ICD-10-CM

## 2022-05-25 DIAGNOSIS — O40.3XX0 POLYHYDRAMNIOS IN THIRD TRIMESTER COMPLICATION, SINGLE OR UNSPECIFIED FETUS: ICD-10-CM

## 2022-05-25 DIAGNOSIS — O40.3XX0 POLYHYDRAMNIOS IN THIRD TRIMESTER COMPLICATION, SINGLE OR UNSPECIFIED FETUS: Primary | ICD-10-CM

## 2022-05-25 PROCEDURE — 76815 OB US LIMITED FETUS(S): CPT | Performed by: RADIOLOGY

## 2022-05-25 PROCEDURE — 0502F SUBSEQUENT PRENATAL CARE: CPT | Performed by: OBSTETRICS & GYNECOLOGY

## 2022-05-25 PROCEDURE — 59025 FETAL NON-STRESS TEST: CPT | Performed by: OBSTETRICS & GYNECOLOGY

## 2022-05-26 ENCOUNTER — APPOINTMENT (OUTPATIENT)
Dept: ULTRASOUND IMAGING | Age: 27
End: 2022-05-26
Attending: OBSTETRICS & GYNECOLOGY

## 2022-05-26 ENCOUNTER — OB CHECK (OUTPATIENT)
Dept: MATERNAL FETAL MEDICINE | Age: 27
End: 2022-05-26

## 2022-05-26 ENCOUNTER — IMAGING SERVICES (OUTPATIENT)
Dept: ULTRASOUND IMAGING | Age: 27
End: 2022-05-26
Attending: OBSTETRICS & GYNECOLOGY

## 2022-05-26 VITALS
WEIGHT: 212.2 LBS | BODY MASS INDEX: 40.09 KG/M2 | DIASTOLIC BLOOD PRESSURE: 84 MMHG | HEART RATE: 96 BPM | RESPIRATION RATE: 20 BRPM | SYSTOLIC BLOOD PRESSURE: 120 MMHG

## 2022-05-26 DIAGNOSIS — O40.3XX0 POLYHYDRAMNIOS IN THIRD TRIMESTER COMPLICATION, SINGLE OR UNSPECIFIED FETUS: ICD-10-CM

## 2022-05-26 DIAGNOSIS — O40.3XX0 POLYHYDRAMNIOS IN THIRD TRIMESTER COMPLICATION, SINGLE OR UNSPECIFIED FETUS: Primary | ICD-10-CM

## 2022-05-26 DIAGNOSIS — O99.213 OBESITY AFFECTING PREGNANCY IN THIRD TRIMESTER: ICD-10-CM

## 2022-05-26 PROCEDURE — 76816 OB US FOLLOW-UP PER FETUS: CPT | Performed by: OBSTETRICS & GYNECOLOGY

## 2022-05-26 PROCEDURE — 99214 OFFICE O/P EST MOD 30 MIN: CPT | Performed by: OBSTETRICS & GYNECOLOGY

## 2022-05-27 ENCOUNTER — DOCUMENTATION (OUTPATIENT)
Dept: OBGYN | Age: 27
End: 2022-05-27

## 2022-05-27 ENCOUNTER — APPOINTMENT (OUTPATIENT)
Dept: OBGYN | Age: 27
End: 2022-05-27

## 2022-06-01 ENCOUNTER — LAB SERVICES (OUTPATIENT)
Dept: LAB | Age: 27
End: 2022-06-01

## 2022-06-01 ENCOUNTER — LAB REQUISITION (OUTPATIENT)
Dept: LAB | Age: 27
End: 2022-06-01

## 2022-06-01 ENCOUNTER — IMAGING SERVICES (OUTPATIENT)
Dept: ULTRASOUND IMAGING | Age: 27
End: 2022-06-01
Attending: OBSTETRICS & GYNECOLOGY

## 2022-06-01 ENCOUNTER — OB CHECK (OUTPATIENT)
Dept: OBGYN | Age: 27
End: 2022-06-01

## 2022-06-01 VITALS
BODY MASS INDEX: 40.32 KG/M2 | HEART RATE: 104 BPM | TEMPERATURE: 96.8 F | WEIGHT: 213.41 LBS | SYSTOLIC BLOOD PRESSURE: 110 MMHG | RESPIRATION RATE: 20 BRPM | DIASTOLIC BLOOD PRESSURE: 80 MMHG

## 2022-06-01 DIAGNOSIS — O40.3XX0 POLYHYDRAMNIOS IN THIRD TRIMESTER COMPLICATION, SINGLE OR UNSPECIFIED FETUS: ICD-10-CM

## 2022-06-01 DIAGNOSIS — O99.213 OBESITY AFFECTING PREGNANCY IN THIRD TRIMESTER: ICD-10-CM

## 2022-06-01 DIAGNOSIS — O99.213 OBESITY AFFECTING PREGNANCY IN THIRD TRIMESTER: Primary | ICD-10-CM

## 2022-06-01 DIAGNOSIS — O40.3XX0 POLYHYDRAMNIOS, THIRD TRIMESTER, NOT APPLICABLE OR UNSPECIFIED: ICD-10-CM

## 2022-06-01 DIAGNOSIS — O40.3XX0 POLYHYDRAMNIOS IN THIRD TRIMESTER COMPLICATION, SINGLE OR UNSPECIFIED FETUS: Primary | ICD-10-CM

## 2022-06-01 PROCEDURE — 76815 OB US LIMITED FETUS(S): CPT | Performed by: RADIOLOGY

## 2022-06-01 PROCEDURE — 86644 CMV ANTIBODY: CPT | Performed by: OBSTETRICS & GYNECOLOGY

## 2022-06-01 PROCEDURE — 86644 CMV ANTIBODY: CPT | Performed by: CLINICAL MEDICAL LABORATORY

## 2022-06-01 PROCEDURE — 59025 FETAL NON-STRESS TEST: CPT | Performed by: OBSTETRICS & GYNECOLOGY

## 2022-06-01 PROCEDURE — 36415 COLL VENOUS BLD VENIPUNCTURE: CPT | Performed by: OBSTETRICS & GYNECOLOGY

## 2022-06-01 PROCEDURE — 0502F SUBSEQUENT PRENATAL CARE: CPT | Performed by: OBSTETRICS & GYNECOLOGY

## 2022-06-01 PROCEDURE — PSEU8513 CMV ANTIBODY IGG: Performed by: CLINICAL MEDICAL LABORATORY

## 2022-06-03 ENCOUNTER — MED INFO FORMS (OUTPATIENT)
Dept: HEALTH INFORMATION MANAGEMENT | Facility: OTHER | Age: 27
End: 2022-06-03

## 2022-06-03 LAB
CMV IGG SERPL IA-ACNC: 1.16 ISR
CMV IGG SERPL IA-ACNC: 1.16 ISR

## 2022-06-07 ENCOUNTER — APPOINTMENT (OUTPATIENT)
Dept: OBGYN | Age: 27
End: 2022-06-07

## 2022-06-08 ENCOUNTER — OB CHECK (OUTPATIENT)
Dept: OBGYN | Age: 27
End: 2022-06-08

## 2022-06-08 ENCOUNTER — IMAGING SERVICES (OUTPATIENT)
Dept: ULTRASOUND IMAGING | Age: 27
End: 2022-06-08
Attending: OBSTETRICS & GYNECOLOGY

## 2022-06-08 VITALS
RESPIRATION RATE: 16 BRPM | SYSTOLIC BLOOD PRESSURE: 108 MMHG | DIASTOLIC BLOOD PRESSURE: 60 MMHG | HEART RATE: 80 BPM | WEIGHT: 215.72 LBS | BODY MASS INDEX: 40.76 KG/M2

## 2022-06-08 DIAGNOSIS — O40.3XX0 POLYHYDRAMNIOS IN THIRD TRIMESTER COMPLICATION, SINGLE OR UNSPECIFIED FETUS: ICD-10-CM

## 2022-06-08 DIAGNOSIS — O99.213 OBESITY AFFECTING PREGNANCY IN THIRD TRIMESTER: Primary | ICD-10-CM

## 2022-06-08 DIAGNOSIS — Z11.8 SCREENING FOR CHLAMYDIAL DISEASE: ICD-10-CM

## 2022-06-08 DIAGNOSIS — Z36.85 SCREENING, ANTENATAL, FOR STREPTOCOCCUS B: ICD-10-CM

## 2022-06-08 DIAGNOSIS — O99.213 OBESITY AFFECTING PREGNANCY IN THIRD TRIMESTER: ICD-10-CM

## 2022-06-08 PROCEDURE — 87591 N.GONORRHOEAE DNA AMP PROB: CPT | Performed by: OBSTETRICS & GYNECOLOGY

## 2022-06-08 PROCEDURE — 87081 CULTURE SCREEN ONLY: CPT | Performed by: OBSTETRICS & GYNECOLOGY

## 2022-06-08 PROCEDURE — 87491 CHLMYD TRACH DNA AMP PROBE: CPT | Performed by: OBSTETRICS & GYNECOLOGY

## 2022-06-08 PROCEDURE — 59025 FETAL NON-STRESS TEST: CPT | Performed by: OBSTETRICS & GYNECOLOGY

## 2022-06-08 PROCEDURE — 76815 OB US LIMITED FETUS(S): CPT | Performed by: RADIOLOGY

## 2022-06-08 PROCEDURE — 87653 STREP B DNA AMP PROBE: CPT | Performed by: OBSTETRICS & GYNECOLOGY

## 2022-06-08 PROCEDURE — 87661 TRICHOMONAS VAGINALIS AMPLIF: CPT | Performed by: OBSTETRICS & GYNECOLOGY

## 2022-06-08 PROCEDURE — 0502F SUBSEQUENT PRENATAL CARE: CPT | Performed by: OBSTETRICS & GYNECOLOGY

## 2022-06-09 LAB
C TRACH DNA GENITAL QL NAA+PROBE: NEGATIVE
FINAL REPORT: NORMAL
N GONORRHOEA DNA GENITAL QL NAA+PROBE: NEGATIVE
T VAGINALIS DNA GENITAL QL NAA+PROBE: NEGATIVE

## 2022-06-15 ENCOUNTER — IMAGING SERVICES (OUTPATIENT)
Dept: ULTRASOUND IMAGING | Age: 27
End: 2022-06-15
Attending: OBSTETRICS & GYNECOLOGY

## 2022-06-15 ENCOUNTER — OB CHECK (OUTPATIENT)
Dept: OBGYN | Age: 27
End: 2022-06-15

## 2022-06-15 VITALS
HEART RATE: 108 BPM | DIASTOLIC BLOOD PRESSURE: 78 MMHG | SYSTOLIC BLOOD PRESSURE: 122 MMHG | WEIGHT: 214.18 LBS | RESPIRATION RATE: 20 BRPM | BODY MASS INDEX: 40.47 KG/M2

## 2022-06-15 DIAGNOSIS — O99.213 OBESITY AFFECTING PREGNANCY IN THIRD TRIMESTER: ICD-10-CM

## 2022-06-15 DIAGNOSIS — O40.3XX0 POLYHYDRAMNIOS IN THIRD TRIMESTER COMPLICATION, SINGLE OR UNSPECIFIED FETUS: ICD-10-CM

## 2022-06-15 DIAGNOSIS — O99.213 OBESITY AFFECTING PREGNANCY IN THIRD TRIMESTER: Primary | ICD-10-CM

## 2022-06-15 PROCEDURE — 59025 FETAL NON-STRESS TEST: CPT | Performed by: OBSTETRICS & GYNECOLOGY

## 2022-06-15 PROCEDURE — 76815 OB US LIMITED FETUS(S): CPT | Performed by: RADIOLOGY

## 2022-06-15 PROCEDURE — 0502F SUBSEQUENT PRENATAL CARE: CPT | Performed by: OBSTETRICS & GYNECOLOGY

## 2022-06-20 ENCOUNTER — APPOINTMENT (OUTPATIENT)
Dept: ULTRASOUND IMAGING | Age: 27
End: 2022-06-20
Attending: OBSTETRICS & GYNECOLOGY

## 2022-06-22 ENCOUNTER — OB CHECK (OUTPATIENT)
Dept: OBGYN | Age: 27
End: 2022-06-22

## 2022-06-22 ENCOUNTER — TELEPHONE (OUTPATIENT)
Dept: OBGYN | Age: 27
End: 2022-06-22

## 2022-06-22 ENCOUNTER — IMAGING SERVICES (OUTPATIENT)
Dept: ULTRASOUND IMAGING | Age: 27
End: 2022-06-22
Attending: OBSTETRICS & GYNECOLOGY

## 2022-06-22 VITALS
WEIGHT: 217.15 LBS | HEART RATE: 74 BPM | SYSTOLIC BLOOD PRESSURE: 108 MMHG | BODY MASS INDEX: 41.03 KG/M2 | TEMPERATURE: 96.7 F | DIASTOLIC BLOOD PRESSURE: 62 MMHG | RESPIRATION RATE: 14 BRPM

## 2022-06-22 DIAGNOSIS — O99.213 OBESITY AFFECTING PREGNANCY IN THIRD TRIMESTER: Primary | ICD-10-CM

## 2022-06-22 DIAGNOSIS — O99.213 OBESITY AFFECTING PREGNANCY IN THIRD TRIMESTER: ICD-10-CM

## 2022-06-22 DIAGNOSIS — O40.3XX0 POLYHYDRAMNIOS IN THIRD TRIMESTER COMPLICATION, SINGLE OR UNSPECIFIED FETUS: ICD-10-CM

## 2022-06-22 PROCEDURE — 0502F SUBSEQUENT PRENATAL CARE: CPT | Performed by: OBSTETRICS & GYNECOLOGY

## 2022-06-22 PROCEDURE — 76816 OB US FOLLOW-UP PER FETUS: CPT | Performed by: RADIOLOGY

## 2022-06-22 PROCEDURE — 59025 FETAL NON-STRESS TEST: CPT

## 2022-06-23 ENCOUNTER — APPOINTMENT (OUTPATIENT)
Dept: ULTRASOUND IMAGING | Age: 27
End: 2022-06-23
Attending: OBSTETRICS & GYNECOLOGY

## 2022-06-23 ENCOUNTER — APPOINTMENT (OUTPATIENT)
Dept: OBGYN | Age: 27
End: 2022-06-23

## 2022-06-28 PROCEDURE — 59510 CESAREAN DELIVERY: CPT | Performed by: OBSTETRICS & GYNECOLOGY

## 2022-06-29 ENCOUNTER — APPOINTMENT (OUTPATIENT)
Dept: OBGYN | Age: 27
End: 2022-06-29

## 2022-06-29 ENCOUNTER — APPOINTMENT (OUTPATIENT)
Dept: ULTRASOUND IMAGING | Age: 27
End: 2022-06-29
Attending: OBSTETRICS & GYNECOLOGY

## 2022-07-13 ENCOUNTER — POSTPARTUM VISIT (OUTPATIENT)
Dept: OBGYN | Age: 27
End: 2022-07-13

## 2022-07-13 VITALS
BODY MASS INDEX: 36.44 KG/M2 | SYSTOLIC BLOOD PRESSURE: 112 MMHG | DIASTOLIC BLOOD PRESSURE: 66 MMHG | TEMPERATURE: 97.8 F | HEIGHT: 62 IN | WEIGHT: 198 LBS

## 2022-07-13 DIAGNOSIS — Z98.891 S/P C-SECTION: Primary | ICD-10-CM

## 2022-07-13 PROCEDURE — 99024 POSTOP FOLLOW-UP VISIT: CPT | Performed by: OBSTETRICS & GYNECOLOGY

## 2022-07-13 RX ORDER — DOCUSATE SODIUM 100 MG/1
100 CAPSULE, LIQUID FILLED ORAL 2 TIMES DAILY
COMMUNITY
Start: 2022-07-01

## 2022-07-13 RX ORDER — IBUPROFEN 600 MG/1
600 TABLET ORAL EVERY 6 HOURS
COMMUNITY
Start: 2022-07-01

## 2022-07-13 ASSESSMENT — EDINBURGH POSTNATAL DEPRESSION SCALE (EPDS)
THE THOUGHT OF HARMING MYSELF HAS OCCURRED TO ME: NEVER
I HAVE BEEN SO UNHAPPY THAT I HAVE BEEN CRYING: NO, NEVER
I HAVE BEEN ANXIOUS OR WORRIED FOR NO GOOD REASON: NO, NOT AT ALL
I HAVE BEEN ABLE TO LAUGH AND SEE THE FUNNY SIDE OF THINGS: AS MUCH AS I ALWAYS COULD
TOTAL SCORE: 0
I HAVE FELT SAD OR MISERABLE: NO, NOT AT ALL
THINGS HAVE BEEN GETTING ON TOP OF ME: NO, I HAVE BEEN COPING AS WELL AS EVER
I HAVE BEEN SO UNHAPPY THAT I HAVE HAD DIFFICULTY SLEEPING: NOT AT ALL
I HAVE BLAMED MYSELF UNNECESSARILY WHEN THINGS WENT WRONG: NO, NEVER
I HAVE LOOKED FORWARD WITH ENJOYMENT TO THINGS: AS MUCH AS I EVER DID
I HAVE FELT SCARED OR PANICKY FOR NO GOOD REASON: NO, NOT AT ALL

## 2022-08-11 ENCOUNTER — APPOINTMENT (OUTPATIENT)
Dept: OBGYN | Age: 27
End: 2022-08-11

## 2023-05-05 ENCOUNTER — TELEPHONE (OUTPATIENT)
Dept: OBGYN | Age: 28
End: 2023-05-05

## 2025-07-22 ENCOUNTER — HOSPITAL ENCOUNTER (EMERGENCY)
Facility: HOSPITAL | Age: 30
Discharge: LEFT BEFORE DISCHARGE | End: 2025-07-22

## 2025-07-22 VITALS
DIASTOLIC BLOOD PRESSURE: 85 MMHG | HEART RATE: 113 BPM | OXYGEN SATURATION: 100 % | TEMPERATURE: 99 F | SYSTOLIC BLOOD PRESSURE: 144 MMHG | HEIGHT: 62 IN | RESPIRATION RATE: 16 BRPM | BODY MASS INDEX: 40.48 KG/M2 | WEIGHT: 220 LBS

## 2025-07-22 PROCEDURE — 93005 ELECTROCARDIOGRAM TRACING: CPT

## 2025-07-22 PROCEDURE — 99281 EMR DPT VST MAYX REQ PHY/QHP: CPT

## 2025-07-22 NOTE — ED INITIAL ASSESSMENT (HPI)
About an hour ago, pt was standing over the sink feeling lightheaded and fell back. + HA. Denies N/V.

## 2025-07-23 LAB
ATRIAL RATE: 98 BPM
P AXIS: 59 DEGREES
P-R INTERVAL: 138 MS
Q-T INTERVAL: 336 MS
QRS DURATION: 82 MS
QTC CALCULATION (BEZET): 428 MS
R AXIS: -6 DEGREES
T AXIS: 22 DEGREES
VENTRICULAR RATE: 98 BPM

## (undated) NOTE — ED AVS SNAPSHOT
Agnes Newell   MRN: [id number]    Department:  [facility] Emergency Department   Date of Visit:  10/20/2018           Disclosure     Insurance plans vary and the physician(s) referred by the ER may not be covered by your plan.  Please contact you tell this physician (or your personal doctor if your instructions are to return to your personal doctor) about any new or lasting problems. The primary care or specialist physician will see patients referred from the [facility] Emergency Department.  Taisha Hernandez

## (undated) NOTE — LETTER
Date & Time: 10/20/2018, 3:27 PM  Patient: Aditya Tan  Encounter Provider(s):    Katty Linares MD       To Whom It May Concern:    Aditya Tan was seen and treated in our department on 10/20/2018. She may return to work on 10/24/18.     If you h